# Patient Record
Sex: MALE | URBAN - NONMETROPOLITAN AREA
[De-identification: names, ages, dates, MRNs, and addresses within clinical notes are randomized per-mention and may not be internally consistent; named-entity substitution may affect disease eponyms.]

---

## 2018-02-17 ENCOUNTER — NURSE TRIAGE (OUTPATIENT)
Dept: ADMINISTRATIVE | Age: 68
End: 2018-02-17

## 2018-02-17 NOTE — TELEPHONE ENCOUNTER
Pain with coughing           Reason for Disposition   [1] Continuous (nonstop) coughing interferes with work or school AND [2] no improvement using cough treatment per Care Advice    Answer Assessment - Initial Assessment Questions  1. ONSET: \"When did the cough begin? \"       For about 4-5 day  2. SEVERITY: \"How bad is the cough today? \"       Worse than today  3. RESPIRATORY DISTRESS: \"Describe your breathing. \"       Can still stleep  4. FEVER: \"Do you have a fever? \" If so, ask: \"What is your temperature, how was it measured, and when did it start? \"      No fever  5. SPUTUM: \"Describe the color of your sputum\" (clear, white, yellow, green)      White sputum  6. HEMOPTYSIS: \"Are you coughing up any blood? \" If so ask: \"How much? \" (flecks, streaks, tablespoons, etc.)      no  7. CARDIAC HISTORY: \"Do you have any history of heart disease? \" (e.g., heart attack, congestive heart failure)       no  8. LUNG HISTORY: \"Do you have any history of lung disease? \"  (e.g., pulmonary embolus, asthma, emphysema)      no  9. PE RISK FACTORS: \"Do you have a history of blood clots? \" (or: recent major surgery, recent prolonged travel, bedridden )      No   10. OTHER SYMPTOMS: \"Do you have any other symptoms? \" (e.g., runny nose, wheezing, chest pain)      coughing  11. PREGNANCY: \"Is there any chance you are pregnant? \" \"When was your last menstrual period? \"     male  15. TRAVEL: \"Have you traveled out of the country in the last month? \" (e.g., travel history, exposures)      no    Protocols used: COUGH - ACUTE PRODUCTIVE-ADULT-AH

## 2019-04-16 ENCOUNTER — NURSE TRIAGE (OUTPATIENT)
Dept: ADMINISTRATIVE | Age: 69
End: 2019-04-16

## 2022-11-09 RX ORDER — AMLODIPINE BESYLATE 5 MG/1
5 TABLET ORAL DAILY
COMMUNITY

## 2022-11-09 RX ORDER — HYDROCHLOROTHIAZIDE 12.5 MG/1
12.5 CAPSULE, GELATIN COATED ORAL DAILY
COMMUNITY

## 2022-11-09 RX ORDER — LEVOTHYROXINE SODIUM 0.03 MG/1
25 TABLET ORAL DAILY
COMMUNITY

## 2022-11-09 RX ORDER — METOPROLOL SUCCINATE 25 MG/1
25 TABLET, EXTENDED RELEASE ORAL DAILY
COMMUNITY

## 2022-11-09 RX ORDER — ATORVASTATIN CALCIUM 20 MG/1
20 TABLET, FILM COATED ORAL DAILY
COMMUNITY

## 2022-11-09 RX ORDER — LOSARTAN POTASSIUM 100 MG/1
100 TABLET ORAL DAILY
COMMUNITY

## 2022-11-09 NOTE — PROGRESS NOTES
Patient instructed on the pre-operative, intra-operative, and post-operative process. Patient instructed on NPO status. Medication instructions and pre operative instruction sheet reviewed with the patient. CHG skin prep instructions reviewed with patient. Pt states he was not given surgical soap. Instructed pt to shower the night before and morning of surgery with anti bacterial soap. Instructed pt to take lipitor, metoprolol, amlodipine, and synthroid with a small sip of water prior to arriving to the hospital the day of surgery and to bring crutches to hospital day of surgery.

## 2022-11-10 ENCOUNTER — ANESTHESIA EVENT (OUTPATIENT)
Dept: OPERATING ROOM | Age: 72
End: 2022-11-10
Payer: MEDICARE

## 2022-11-11 ENCOUNTER — ANESTHESIA (OUTPATIENT)
Dept: OPERATING ROOM | Age: 72
End: 2022-11-11
Payer: MEDICARE

## 2022-11-11 ENCOUNTER — APPOINTMENT (OUTPATIENT)
Dept: GENERAL RADIOLOGY | Age: 72
End: 2022-11-11
Attending: PODIATRIST
Payer: MEDICARE

## 2022-11-11 ENCOUNTER — HOSPITAL ENCOUNTER (OUTPATIENT)
Age: 72
Setting detail: OUTPATIENT SURGERY
Discharge: HOME | End: 2022-11-11
Attending: PODIATRIST | Admitting: PODIATRIST
Payer: MEDICARE

## 2022-11-11 VITALS
OXYGEN SATURATION: 96 % | BODY MASS INDEX: 30.62 KG/M2 | RESPIRATION RATE: 20 BRPM | WEIGHT: 202 LBS | DIASTOLIC BLOOD PRESSURE: 75 MMHG | SYSTOLIC BLOOD PRESSURE: 131 MMHG | HEIGHT: 68 IN | TEMPERATURE: 97.5 F | HEART RATE: 61 BPM

## 2022-11-11 DIAGNOSIS — M19.072 ARTHRITIS OF LEFT FOOT: Primary | ICD-10-CM

## 2022-11-11 PROCEDURE — 6360000002 HC RX W HCPCS: Performed by: PODIATRIST

## 2022-11-11 PROCEDURE — C1713 ANCHOR/SCREW BN/BN,TIS/BN: HCPCS | Performed by: PODIATRIST

## 2022-11-11 PROCEDURE — 2580000003 HC RX 258: Performed by: NURSE ANESTHETIST, CERTIFIED REGISTERED

## 2022-11-11 PROCEDURE — 7100000001 HC PACU RECOVERY - ADDTL 15 MIN: Performed by: PODIATRIST

## 2022-11-11 PROCEDURE — 64445 NJX AA&/STRD SCIATIC NRV IMG: CPT | Performed by: NURSE ANESTHETIST, CERTIFIED REGISTERED

## 2022-11-11 PROCEDURE — 7100000010 HC PHASE II RECOVERY - FIRST 15 MIN: Performed by: PODIATRIST

## 2022-11-11 PROCEDURE — 2500000003 HC RX 250 WO HCPCS: Performed by: NURSE ANESTHETIST, CERTIFIED REGISTERED

## 2022-11-11 PROCEDURE — C1769 GUIDE WIRE: HCPCS | Performed by: PODIATRIST

## 2022-11-11 PROCEDURE — 6360000002 HC RX W HCPCS: Performed by: NURSE ANESTHETIST, CERTIFIED REGISTERED

## 2022-11-11 PROCEDURE — 7100000011 HC PHASE II RECOVERY - ADDTL 15 MIN: Performed by: PODIATRIST

## 2022-11-11 PROCEDURE — 64447 NJX AA&/STRD FEMORAL NRV IMG: CPT | Performed by: NURSE ANESTHETIST, CERTIFIED REGISTERED

## 2022-11-11 PROCEDURE — 3700000000 HC ANESTHESIA ATTENDED CARE: Performed by: PODIATRIST

## 2022-11-11 PROCEDURE — 3209999900 FLUORO FOR SURGICAL PROCEDURES

## 2022-11-11 PROCEDURE — 2709999900 HC NON-CHARGEABLE SUPPLY: Performed by: PODIATRIST

## 2022-11-11 PROCEDURE — 3700000001 HC ADD 15 MINUTES (ANESTHESIA): Performed by: PODIATRIST

## 2022-11-11 PROCEDURE — 3600000003 HC SURGERY LEVEL 3 BASE: Performed by: PODIATRIST

## 2022-11-11 PROCEDURE — 2720000010 HC SURG SUPPLY STERILE: Performed by: PODIATRIST

## 2022-11-11 PROCEDURE — 6370000000 HC RX 637 (ALT 250 FOR IP): Performed by: NURSE ANESTHETIST, CERTIFIED REGISTERED

## 2022-11-11 PROCEDURE — 3600000013 HC SURGERY LEVEL 3 ADDTL 15MIN: Performed by: PODIATRIST

## 2022-11-11 PROCEDURE — 7100000000 HC PACU RECOVERY - FIRST 15 MIN: Performed by: PODIATRIST

## 2022-11-11 DEVICE — SCREW BNE L80MM DIA7.3MM THRD L16MM CANC S STL SELF DRL ST: Type: IMPLANTABLE DEVICE | Site: FOOT | Status: FUNCTIONAL

## 2022-11-11 DEVICE — GRAFT HUM TISS 10ML FRMBL CELLULAR BNE MTRX CRYOPRESERVED: Type: IMPLANTABLE DEVICE | Site: FOOT | Status: FUNCTIONAL

## 2022-11-11 DEVICE — IMPLANTABLE DEVICE: Type: IMPLANTABLE DEVICE | Site: FOOT | Status: FUNCTIONAL

## 2022-11-11 DEVICE — SCREW BNE L85MM DIA7.3MM THRD L32MM CANC S STL SELF DRL ST: Type: IMPLANTABLE DEVICE | Site: FOOT | Status: FUNCTIONAL

## 2022-11-11 DEVICE — DBX PASTE, 1CC
Type: IMPLANTABLE DEVICE | Site: FOOT | Status: FUNCTIONAL
Brand: DBX®

## 2022-11-11 DEVICE — KIT STPL BRDG 18MM LEG 18MM MAX CLSR 10.4MM BME ELITE: Type: IMPLANTABLE DEVICE | Site: FOOT | Status: FUNCTIONAL

## 2022-11-11 DEVICE — KIT STPL W20XL20MM BNE FIX BRDG 2 LEG CONT COMPR BME ELITE: Type: IMPLANTABLE DEVICE | Site: FOOT | Status: FUNCTIONAL

## 2022-11-11 RX ORDER — DEXAMETHASONE SODIUM PHOSPHATE 4 MG/ML
INJECTION, SOLUTION INTRA-ARTICULAR; INTRALESIONAL; INTRAMUSCULAR; INTRAVENOUS; SOFT TISSUE PRN
Status: DISCONTINUED | OUTPATIENT
Start: 2022-11-11 | End: 2022-11-11 | Stop reason: SDUPTHER

## 2022-11-11 RX ORDER — FENTANYL CITRATE 50 UG/ML
25 INJECTION, SOLUTION INTRAMUSCULAR; INTRAVENOUS EVERY 5 MIN PRN
Status: DISCONTINUED | OUTPATIENT
Start: 2022-11-11 | End: 2022-11-11 | Stop reason: HOSPADM

## 2022-11-11 RX ORDER — SODIUM CHLORIDE, SODIUM LACTATE, POTASSIUM CHLORIDE, CALCIUM CHLORIDE 600; 310; 30; 20 MG/100ML; MG/100ML; MG/100ML; MG/100ML
INJECTION, SOLUTION INTRAVENOUS CONTINUOUS
Status: DISCONTINUED | OUTPATIENT
Start: 2022-11-11 | End: 2022-11-11 | Stop reason: HOSPADM

## 2022-11-11 RX ORDER — EPHEDRINE SULFATE/0.9% NACL/PF 25 MG/5 ML
SYRINGE (ML) INTRAVENOUS PRN
Status: DISCONTINUED | OUTPATIENT
Start: 2022-11-11 | End: 2022-11-11 | Stop reason: SDUPTHER

## 2022-11-11 RX ORDER — SODIUM CHLORIDE 0.9 % (FLUSH) 0.9 %
5-40 SYRINGE (ML) INJECTION EVERY 12 HOURS SCHEDULED
Status: DISCONTINUED | OUTPATIENT
Start: 2022-11-11 | End: 2022-11-11 | Stop reason: HOSPADM

## 2022-11-11 RX ORDER — FENTANYL CITRATE 50 UG/ML
INJECTION, SOLUTION INTRAMUSCULAR; INTRAVENOUS PRN
Status: DISCONTINUED | OUTPATIENT
Start: 2022-11-11 | End: 2022-11-11 | Stop reason: SDUPTHER

## 2022-11-11 RX ORDER — SODIUM CHLORIDE, SODIUM LACTATE, POTASSIUM CHLORIDE, CALCIUM CHLORIDE 600; 310; 30; 20 MG/100ML; MG/100ML; MG/100ML; MG/100ML
INJECTION, SOLUTION INTRAVENOUS CONTINUOUS PRN
Status: DISCONTINUED | OUTPATIENT
Start: 2022-11-11 | End: 2022-11-11 | Stop reason: SDUPTHER

## 2022-11-11 RX ORDER — SODIUM CHLORIDE 0.9 % (FLUSH) 0.9 %
5-40 SYRINGE (ML) INJECTION PRN
Status: DISCONTINUED | OUTPATIENT
Start: 2022-11-11 | End: 2022-11-11 | Stop reason: HOSPADM

## 2022-11-11 RX ORDER — SODIUM CHLORIDE 9 MG/ML
INJECTION, SOLUTION INTRAVENOUS PRN
Status: DISCONTINUED | OUTPATIENT
Start: 2022-11-11 | End: 2022-11-11 | Stop reason: HOSPADM

## 2022-11-11 RX ORDER — CEPHALEXIN 500 MG/1
500 CAPSULE ORAL 3 TIMES DAILY
Qty: 21 CAPSULE | Refills: 0 | Status: SHIPPED | OUTPATIENT
Start: 2022-11-11

## 2022-11-11 RX ORDER — LIDOCAINE HYDROCHLORIDE 20 MG/ML
INJECTION, SOLUTION EPIDURAL; INFILTRATION; INTRACAUDAL; PERINEURAL PRN
Status: DISCONTINUED | OUTPATIENT
Start: 2022-11-11 | End: 2022-11-11 | Stop reason: SDUPTHER

## 2022-11-11 RX ORDER — FENTANYL CITRATE 50 UG/ML
50 INJECTION, SOLUTION INTRAMUSCULAR; INTRAVENOUS EVERY 5 MIN PRN
Status: DISCONTINUED | OUTPATIENT
Start: 2022-11-11 | End: 2022-11-11 | Stop reason: HOSPADM

## 2022-11-11 RX ORDER — OXYCODONE HYDROCHLORIDE AND ACETAMINOPHEN 5; 325 MG/1; MG/1
1 TABLET ORAL EVERY 4 HOURS PRN
Qty: 40 TABLET | Refills: 0 | Status: SHIPPED | OUTPATIENT
Start: 2022-11-11 | End: 2022-11-18

## 2022-11-11 RX ORDER — PROPOFOL 10 MG/ML
INJECTION, EMULSION INTRAVENOUS PRN
Status: DISCONTINUED | OUTPATIENT
Start: 2022-11-11 | End: 2022-11-11 | Stop reason: SDUPTHER

## 2022-11-11 RX ORDER — ONDANSETRON 2 MG/ML
INJECTION INTRAMUSCULAR; INTRAVENOUS PRN
Status: DISCONTINUED | OUTPATIENT
Start: 2022-11-11 | End: 2022-11-11 | Stop reason: SDUPTHER

## 2022-11-11 RX ORDER — DEXAMETHASONE SODIUM PHOSPHATE 10 MG/ML
INJECTION, SOLUTION INTRAMUSCULAR; INTRAVENOUS PRN
Status: DISCONTINUED | OUTPATIENT
Start: 2022-11-11 | End: 2022-11-11 | Stop reason: SDUPTHER

## 2022-11-11 RX ORDER — ACETAMINOPHEN 325 MG/1
650 TABLET ORAL ONCE
Status: COMPLETED | OUTPATIENT
Start: 2022-11-11 | End: 2022-11-11

## 2022-11-11 RX ORDER — ROPIVACAINE HYDROCHLORIDE 5 MG/ML
INJECTION, SOLUTION EPIDURAL; INFILTRATION; PERINEURAL PRN
Status: DISCONTINUED | OUTPATIENT
Start: 2022-11-11 | End: 2022-11-11 | Stop reason: SDUPTHER

## 2022-11-11 RX ORDER — OXYCODONE HYDROCHLORIDE 5 MG/1
5 TABLET ORAL PRN
Status: DISCONTINUED | OUTPATIENT
Start: 2022-11-11 | End: 2022-11-11 | Stop reason: HOSPADM

## 2022-11-11 RX ORDER — OXYCODONE HYDROCHLORIDE 5 MG/1
10 TABLET ORAL PRN
Status: DISCONTINUED | OUTPATIENT
Start: 2022-11-11 | End: 2022-11-11 | Stop reason: HOSPADM

## 2022-11-11 RX ORDER — DIMENHYDRINATE 50 MG/1
50 TABLET ORAL ONCE
Status: COMPLETED | OUTPATIENT
Start: 2022-11-11 | End: 2022-11-11

## 2022-11-11 RX ADMIN — EPHEDRINE SULFATE 5 MG: 5 INJECTION INTRAVENOUS at 12:52

## 2022-11-11 RX ADMIN — FENTANYL CITRATE 25 MCG: 50 INJECTION INTRAMUSCULAR; INTRAVENOUS at 16:25

## 2022-11-11 RX ADMIN — PROPOFOL 200 MG: 10 INJECTION, EMULSION INTRAVENOUS at 12:36

## 2022-11-11 RX ADMIN — ROPIVACAINE HYDROCHLORIDE 20 ML: 5 INJECTION EPIDURAL; INFILTRATION; PERINEURAL at 11:52

## 2022-11-11 RX ADMIN — FENTANYL CITRATE 100 MCG: 50 INJECTION INTRAMUSCULAR; INTRAVENOUS at 11:28

## 2022-11-11 RX ADMIN — DIMENHYDRINATE 50 MG: 50 TABLET ORAL at 10:51

## 2022-11-11 RX ADMIN — DEXAMETHASONE SODIUM PHOSPHATE 10 MG: 10 INJECTION, SOLUTION INTRAMUSCULAR; INTRAVENOUS at 11:34

## 2022-11-11 RX ADMIN — LIDOCAINE HYDROCHLORIDE 5 ML: 20 INJECTION, SOLUTION EPIDURAL; INFILTRATION; INTRACAUDAL; PERINEURAL at 12:36

## 2022-11-11 RX ADMIN — ONDANSETRON 4 MG: 2 INJECTION INTRAMUSCULAR; INTRAVENOUS at 15:56

## 2022-11-11 RX ADMIN — EPHEDRINE SULFATE 5 MG: 5 INJECTION INTRAVENOUS at 13:03

## 2022-11-11 RX ADMIN — SODIUM CHLORIDE, POTASSIUM CHLORIDE, SODIUM LACTATE AND CALCIUM CHLORIDE: 600; 310; 30; 20 INJECTION, SOLUTION INTRAVENOUS at 11:07

## 2022-11-11 RX ADMIN — DEXAMETHASONE SODIUM PHOSPHATE 4 MG: 4 INJECTION, SOLUTION INTRAMUSCULAR; INTRAVENOUS at 12:50

## 2022-11-11 RX ADMIN — SODIUM CHLORIDE, POTASSIUM CHLORIDE, SODIUM LACTATE AND CALCIUM CHLORIDE: 600; 310; 30; 20 INJECTION, SOLUTION INTRAVENOUS at 14:03

## 2022-11-11 RX ADMIN — PROPOFOL 100 MG: 10 INJECTION, EMULSION INTRAVENOUS at 12:40

## 2022-11-11 RX ADMIN — ROPIVACAINE HYDROCHLORIDE 20 ML: 5 INJECTION EPIDURAL; INFILTRATION; PERINEURAL at 11:34

## 2022-11-11 RX ADMIN — FENTANYL CITRATE 25 MCG: 50 INJECTION INTRAMUSCULAR; INTRAVENOUS at 14:36

## 2022-11-11 RX ADMIN — FENTANYL CITRATE 25 MCG: 50 INJECTION INTRAMUSCULAR; INTRAVENOUS at 14:55

## 2022-11-11 RX ADMIN — SODIUM CHLORIDE, POTASSIUM CHLORIDE, SODIUM LACTATE AND CALCIUM CHLORIDE: 600; 310; 30; 20 INJECTION, SOLUTION INTRAVENOUS at 12:31

## 2022-11-11 RX ADMIN — EPHEDRINE SULFATE 5 MG: 5 INJECTION INTRAVENOUS at 13:18

## 2022-11-11 RX ADMIN — EPHEDRINE SULFATE 5 MG: 5 INJECTION INTRAVENOUS at 13:10

## 2022-11-11 RX ADMIN — ACETAMINOPHEN 650 MG: 325 TABLET ORAL at 10:51

## 2022-11-11 RX ADMIN — EPHEDRINE SULFATE 5 MG: 5 INJECTION INTRAVENOUS at 13:44

## 2022-11-11 RX ADMIN — FENTANYL CITRATE 25 MCG: 50 INJECTION INTRAMUSCULAR; INTRAVENOUS at 14:50

## 2022-11-11 RX ADMIN — CEFAZOLIN 2000 MG: 10 INJECTION, POWDER, FOR SOLUTION INTRAVENOUS at 12:27

## 2022-11-11 ASSESSMENT — LIFESTYLE VARIABLES: SMOKING_STATUS: 0

## 2022-11-11 NOTE — OP NOTE
Operative Note      Patient: Stephen Tran  YOB: 1950  MRN: [id number]    Date of Procedure: 11/11/2022    Pre-Op Diagnosis: EQUINUS CONTRACTURE OF LEFT ANKLE,  PRIMARY OSTEOARTHRITIS, POSTERIOR TIBIAL TENDONITIS    Post-Op Diagnosis: Same       Procedure(s):  GASTROCNEMIUS RECESSION  FOOT ARTHRODESIS-TRIPLE, COTTON PROCEDURE    Surgeon(s):  Vivian Villanueva DPM    Assistant:   First Assistant: Braxton Albrecht    Anesthesia: General    Estimated Blood Loss (mL): 143     Complications: None    Specimens:   none    Implants:  Implant Name Type Inv. Item Serial No.  Lot No. LRB No. Used Action   GRAFT HUM TISS 10ML FRMBL CELLULAR BNE MTRX CRYOPRESERVED - [id number]  GRAFT HUM TISS 10ML FRMBL CELLULAR BNE MTRX CRYOPRESERVED  [facility] [de-identified] Left 1 Implanted   KIT STPL [id number] BNE FIX BRDG 2 LEG CONT COMPR BME ELITE - [id number]  KIT STPL [id number] BNE FIX BRDG 2 LEG CONT COMPR BME ELITE  [facility]PUY [employer] [facility] [id number] Left 1 Implanted   KIT STPL [id number] BNE FIX BRDG 2 LEG CONT COMPR BME ELITE - [id number]  KIT STPL [id number] BNE FIX BRDG 2 LEG CONT COMPR BME ELITE  [facility] DEPUY [employer] [facility] [id number] Left 1 Implanted   KIT STPL [id number] BNE FIX BRDG 2 LEG CONT COMPR BME ELITE - [id number]  KIT STPL [id number] BNE FIX BRDG 2 LEG CONT COMPR BME ELITE  [facility] [employer] [facility] [id number] Left 1 Implanted   KIT STPL BRDG 18MM LEG 18MM MAX CLSR 10.4MM BME ELITE - [id number]  KIT STPL BRDG 18MM LEG 18MM MAX CLSR 10.4MM BME ELITE  [facility] [employer] [facility] [id number] Left 1 Implanted   SCREW BNE L64MM DIA4. 5MM HD DIA6.5MM CANC S STL ST SELF DRL - [id number]  SCREW BNE L64MM DIA4. 5MM HD DIA6.5MM CANC S STL ST SELF DRL  [facility] [employer] [place]  Left 1 Implanted   SCREW BNE L80MM DIA7. 3MM THRD L16MM CANC S STL SELF DRL ST - [id number]  SCREW BNE L80MM DIA7. 3MM THRD L16MM CANC S STL SELF DRL ST  [employer]PU[employer]  Left 1 Implanted   SCREW BNE L85MM DIA7. 3MM THRD L32MM CANC S STL SELF DRL ST - [id number]  SCREW BNE L85MM DIA7. 3MM THRD L32MM CANC S STL SELF DRL ST  DEPU[employer]  Left 1 Implanted   GRAFT BNE [id number] ARNOLD FOR TIPEEK FT OSTEOTMY WDG SYS - [id number]  GRAFT BNE [id number] CATALINA FOR TIPEEK FT OSTEOTMY WDG SYS  DEPUY [employer] [place] [id number] Left 1 Implanted   GRAFT BNE SUB 1ML DEMIN BNE MTRX PSTE SYR FRZ DRY DBX - [id number]  GRAFT BNE SUB 1ML DEMIN BNE MTRX PSTE SYR FRZ DRY DBX [id number] MUSCULOSKELETAL TRANSPL[employer] [facility]-  Left 1 Implanted         Drains: none    Findings: see the narrative     Detailed Description of Procedure: The patient has signs and symptoms, both clinically and radiographically, that are consistent with the preprocedure diagnosis. It was determined the patient would benefit from surgical intervention. All potential risks, benefits, and complications of surgery were discussed with the patient prior the procedure. The patient wished to proceed with surgery. Informed written consent was obtained. The patient was brought from the preoperative area placed in operating table in supine position. Following induction of anesthesia, the operative lower extremity was scrubbed, prepped, and draped in usual sterile fashion. The following procedure was then performed:  Procedure #1: Endoscopic gastrocnemius recession, left: Attention was directed toward the medial aspect patient's right leg where an incision was made slightly inferior to the medial head of gastrocnemius muscle at the level of the aponeurosis. Incision was deepened through subcutaneous slayer down to the level of the gastroc fascia. The fascia was incised allowing for exposure of the aponeurosis. The obturator/cannula was then introduced posterior to the aponeurosis. This was passed laterally through a small stab incision. The skin was tented laterally and a small stab incision made.  the 4.0 mm the scope was then introduced laterally and the aponeurosis directly visualized. At this time EGR hook blade was introduced and passed from lateral to medial, thereby transecting the fascia under direct visualization. Range of motion of the ankle in dorsiflexion was noted to improve from a preoperative value of 0° to a post procedure value of 12°. The instrumentation was removed. The incisions were copiously irrigated with sterile saline. The subcutaneous tissues were reapproximated utilizing 4-0 Vicryl. The skin was reapproximated utilizing 4-0 nylon suture running intradermal fashion. Attention was then directed to procedure #2. Procedure #2: Triple arthrodesis, left foot: Attention was directed to the lateral aspect of the hindfoot where a curvilinear incision was made at the tip of the fibula directed toward the fourth metatarsal base overlying the posterior subtalar joint and sinus tarsi. The incision was deepened through the subcutaneous layer down to the level of the deep tissues. The EDB muscle belly was reflected superiorly. The fatty plug within the sinus tarsi was then excised. The ligamentous and capsular attachments surrounding the subtalar joint were all released. The joint was distracted with a small joint distractor. The remaining cartilage and subchondral bone plate were resected with osteotomes and curettes. Subchondral drilling was carried out with a 2.5 mm drill. At this time graft material was packed within the arthrodesis site. A hindfoot valgus was corrected to near subtalar neutral position. The arthrodesis was then fixated with two 7.3 mm cannulated screws utilizing C arm fluoroscopy for proper alignment. Excellent compression across the arthrodesis site was achieved. Attention was then directed to the talonavicular joint. An incision was made overlying the dorsomedial aspect of the joint deepened through subcutaneous layer down to the capsule and periosteal tissues.   Capsular and periosteal incision was made. The joint was exposed. The joint was then released of all soft tissue ligamentous and capsular attachments. The joint was distracted with a small joint distractor. The cartilaginous surfaces were resected with a reciprocating rasp and osteotome. Subchondral drilling and fish scaling was carried out. The site was packed with cortical cancellous allograft material.  The foot was brought into appropriate neutral transverse plane alignment and plantar flexed at the level of the arthrodesis site. The arthrodesis site was fixated with a 4.5 mm cannulated screw as well as two 20 x 20 mm nitinol compression staples. Excellent compression was noted. Alignment was confirmed with intraoperative C-arm fluoroscopy. Attention was then directed to the calcaneocuboid joint. The joint was distracted with a small joint distractor and the cartilaginous surfaces resected utilizing a reciprocating rasp and osteotomes. Subchondral drilling and for scaling was carried out. The arthrodesis site was then fixated utilizing 220 x 20 mm nitinol compression staples. Excellent compression was noted. Alignment was confirmed with intraoperative C arm. The wound was copiously irrigated with sterile saline. The medial and lateral incisions were reapproximated with 2-0 and 3-0 Vicryl suture for the deep tissues and 3-0 nylon suture in a running interlocking fashion at the skin. Attention was then directed to procedure #3. Procedure #3: Cotton procedure, left foot: Attention was directed to the dorsal aspect of the foot where, utilizing C-arm fluoroscopy, the central aspect of the medial cuneiform was identified. A linear incision was made over this area. This was deepened through subcutaneous layer with care taken to identify and retract all vital neurovascular structures. Dissection was carried down to level the periosteum. A periosteal incision was made allowing for exposure of the bone.   At this time a sagittal saw was used to osteotomize the medial cuneiform from dorsal to plantar, with care taken to preserve a plantar cortical hinge. At this time the trial sizers were used to determine the appropriate size implant. This was noted to correspond to a 8 x 18 mm wedge implant. The implant was then placed and tamped firmly into place. Graft material was then placed around the thyroid adjacent to the implant. Alignment was confirmed utilizing intraoperative C-arm fluoroscopy. The wounds copiously irrigated with sterile saline. The subcutaneous tissues were reapproximated with 4-0 Vicryl. The skin was reapproximated with 4-0 Monocryl suture in a running intradermal fashion. The wounds were dressed with Betadine soaked Adaptic, gauze, and Kerlix. Pre-cast padding, a posterior splint, and overlying double Ace wrap were applied. The patient tolerated the procedure and anesthesia well was transferred from the operating room to the PACU with vital signs stable and vascular status intact to the lower extremity. Following a period of postoperative monitoring, the patient will be discharged with appropriate wound care, ambulatory status, and follow-up instructions.   Sponge and needle count: Correct at the end of the procedure  Electronically signed by Arnaud Hood DPM on 11/11/2022 at 4:37 PM

## 2022-11-11 NOTE — DISCHARGE INSTRUCTIONS
SAME DAY SURGERY DISCHARGE INSTRUCTIONS    1. Do not drive or operate hazardous machinery for 24 hours. 2.  Do not make important personal or business decisions for 24 hours. 3.  Do not drink alcoholic beverages for 24 hours. 4.  Do not smoke tobacco products for 24 hours. 5.  Eat light foods (Jell-O, soups, etc....) and drink plenty of fluids (water, Sprite, etc...) up to 8 glasses per day, as you can tolerate. 6.  If your bandages become soaked with bright red blood, place another dressing pad over your bandages. (DO NOT remove original bandage.)  Call your surgeon for further instructions. A small amount of bright red blood is to be expected. 7.  Limit your activities for 24 hours. Do not engage in heavy work until your surgeon gives you permission. 8.  Report the following signs or any questions regarding your physical condition to your surgeon immediately:    Excessive swelling of, or around the wound area. Redness. Temperature of 100 degrees (F) or above. Excessive pain. 9.  Call your surgeon for any questions regarding your surgery. 10.  Call for an appointment to see your surgeon in one week. 11.  Wash hands before and after incision care. It is important to practice good personal hygiene during the post op period. Discharge Instructions for Peripheral Nerve Block     The following nerve block was performed for your surgery: ____Femoral_____. After the block, you will be unable to have control of the limb and this can last between 3-36 hours. 1. For leg surgery, get help to move around until the feeling in your leg returns. 2. Avoid excessive pressure on limb and having anything hot or cold come in direct contact with your block limb. Check temperature of things with your unblocked leg due to decreased sensation in your blocked side. 3. Many surgical procedures are painful immediately after surgery.  You will not be aware of this pain for several hours due to the block. As the block wears off, you may have tingling in your blocked leg. Start taking your pain medication, as prescribed, before you get the full feeling back in your leg. 4. Nerve blocks affect many types of nerves and can cause feelings such as:     * Numbness, tingling, heaviness, weakness or inability to move your leg, and a feeling that your leg has fallen asleep. 5. If you develop any problems with your blocked leg: * Swelling, redness, fever, or severe pain where the block was inserted that was not relieved with your pain medication please call your       surgeon. Dr. Mejia Pittman instructions:  1. Keep the surgical dressing and splint clean, dry, and intact  2. Elevate the operative extremity above heart level when seated or lying  3. Place ice behind the knee of the operative extremity 20 minutes per hour while awake  4.   Strict nonweightbearing on the operative extremity

## 2022-11-11 NOTE — BRIEF OP NOTE
Brief Postoperative Note      Patient: Abdi Haile  YOB: 1950  MRN: [id number]    Date of Procedure: 11/11/2022    Pre-Op Diagnosis: EQUINUS CONTRACTURE OF LEFT ANKLE,  PRIMARY OSTEOARTHRITIS, POSTERIOR TIBIAL TENDONITIS    Post-Op Diagnosis: Same       Procedure(s):  GASTROCNEMIUS RECESSION  FOOT ARTHRODESIS-TRIPLE, COTTON PROCEDURE    Surgeon(s):  Abraham Ayala DPM    Assistant:  First Assistant: Ryne Davis    Anesthesia: General    Estimated Blood Loss (mL): Minimal    Complications: None    Specimens:   none    Implants:  Implant Name Type Inv. Item Serial No.  Lot No. LRB No. Used Action   GRAFT HUM TISS 10ML FRMBL CELLULAR BNE MTRX CRYOPRESERVED - [id number]  GRAFT HUM TISS 10ML FRMBL CELLULAR BNE MTRX CRYOPRESERVED  [facility] [de-identified] Left 1 Implanted   KIT STPL [id number] BNE FIX BRDG 2 LEG CONT COMPR BME ELITE - [id number]  KIT STPL [id number] BNE FIX BRDG 2 LEG CONT COMPR BME ELITE  [facility]PUY [email or web address] [facility] [id number] Left 1 Implanted   KIT STPL [id number] BNE FIX BRDG 2 LEG CONT COMPR BME ELITE - [id number]  KIT STPL [id number] BNE FIX BRDG 2 LEG CONT COMPR BME ELITE  [facility] [email or web address] [facility] [id number] Left 1 Implanted   KIT STPL [id number] BNE FIX BRDG 2 LEG CONT COMPR BME ELITE - [id number]  KIT STPL [id number] BNE FIX BRDG 2 LEG CONT COMPR BME ELITE  [facility] DEPUY [employer]- [id number] Left 1 Implanted   KIT STPL BRDG 18MM LEG 18MM MAX CLSR 10.4MM BME ELITE - [id number]  KIT STPL BRDG 18MM LEG 18MM MAX CLSR 10.4MM BME ELITE  [facility] DEPUY [email or web address] [facility] [id number] Left 1 Implanted   SCREW BNE L64MM DIA4. 5MM HD DIA6.5MM CANC S STL ST SELF DRL - [id number]  SCREW BNE L64MM DIA4. 5MM HD DIA6.5MM CANC S STL ST SELF DRL  [employer]  Left 1 Implanted   SCREW BNE L80MM DIA7. 3MM THRD L16MM CANC S STL SELF DRL ST - [id number]  SCREW BNE L80MM DIA7. 3MM THRD L16MM CANC S STL SELF DRL ST  [employer]  Left 1 Implanted SCREW BNE L85MM DIA7. 3MM THRD L32MM CANC S STL SELF DRL ST - [id number]  SCREW BNE L85MM DIA7. 3MM THRD L32MM CANC S STL SELF DRL ST  [employer]  Left 1 Implanted   GRAFT BNE [id number] ARNOLD FOR TIPEEK FT OSTEOTMY [facility] SYS - [id number]  GRAFT BNE [id number] ARNOLD FOR TIPEEK FT OSTEOTMY [facility] SYS  DEPUY [employer] [id number] Left 1 Implanted   GRAFT BNE SUB 1ML DEMIN BNE MTRX PSTE SYR FRZ DRY DBX - [id number]  GRAFT BNE SUB 1ML DEMIN BNE MTRX PSTE SYR FRZ DRY DBX [id number] MUSCULOSKELETAL TRANSPLANT [facility]  Left 1 Implanted         Drains: none    Findings: see the narrative     Electronically signed by Jac Levy DPM on 11/11/2022 at 4:36 PM

## 2022-11-11 NOTE — PROGRESS NOTES
Patient verbalizes readiness for discharge. Discharge instructions given to patient and responsible adult, answered all questions, and verbalized understanding of discharge instructions. Discharge Criteria    Inpatients must meet Criteria 1 through 7. All other patients are either YES or N/A. If a NO is chosen then Anesthesia or Surgeon must be notified. 1.  Minimum 30 minutes after last dose of sedative medication, minimum 120 minutes after last dose of reversal agent. Yes      2. Systolic BP stable within 20 mmHg for 30 minutes & systolic BP between 90 & 276 or within 10 mmHg of baseline. Yes      3. Pulse between 60 and 100 or within 10 bpm of baseline. Yes      4. Spontaneous respiratory rate >/= 10 per minute. Yes      5. SaO2 >/= 95 or  >/= baseline. Yes      6. Able to cough and swallow or return to baseline function. Yes      7. Alert and oriented or return to baseline mental status. Yes      8. Demonstrates controlled, coordinated movements, ambulates with steady gait, or return to baseline activity function. Yes      9. Minimal or no pain or nausea, or at a level tolerable and acceptable to patient. Yes      10. Takes and retains oral fluids as allowed. Yes      11. Procedural / perioperative site stable. Minimal or no bleeding. Yes          12. If GI endoscopy procedure, minimal or no abdominal distention or passing flatus. N/A      13. Written discharge instructions and emergency telephone number provided. Yes      14. Accompanied by a responsible adult.     Yes

## 2022-11-11 NOTE — ANESTHESIA PROCEDURE NOTES
Peripheral Block    Patient location during procedure: pre-op  Reason for block: post-op pain management  Start time: 11/11/2022 11:48 AM  End time: 11/11/2022 11:52 AM  Staffing  Performed: resident/CRNA   Resident/CRNA: ERNST Cline CRNA  Preanesthetic Checklist  Completed: patient identified, IV checked, site marked, risks and benefits discussed, surgical/procedural consents, equipment checked, pre-op evaluation, timeout performed, anesthesia consent given, oxygen available and monitors applied/VS acknowledged  Peripheral Block   Patient position: supine  Prep: ChloraPrep  Provider prep: mask and sterile gloves  Patient monitoring: continuous pulse ox, frequent blood pressure checks and IV access  Block type: Femoral  Adductor canal  Laterality: left  Injection technique: single-shot  Guidance: ultrasound guided  Local infiltration: ropivacaine  Infiltration strength: 0.5 %  Local infiltration: ropivacaine  Dose: 20 mL    Needle   Needle type:  Other   Needle gauge: 22 G  Needle localization: anatomical landmarks and ultrasound guidanceOther needle type: Pajunk  Assessment   Injection assessment: negative aspiration for heme, no paresthesia on injection and no intravascular symptoms  Paresthesia pain: none  Slow fractionated injection: yes  Hemodynamics: stable  Real-time US image taken/store: yes  Outcomes: uncomplicated and patient tolerated procedure well

## 2022-11-11 NOTE — ANESTHESIA PROCEDURE NOTES
Peripheral Block    Patient location during procedure: pre-op  Reason for block: post-op pain management and at surgeon's request  Start time: 11/11/2022 11:28 AM  End time: 11/11/2022 11:34 AM  Staffing  Performed: resident/CRNA   Resident/CRNA: ERNST Tanner CRNA  Preanesthetic Checklist  Completed: patient identified, IV checked, site marked, risks and benefits discussed, surgical/procedural consents, equipment checked, pre-op evaluation, timeout performed, anesthesia consent given, oxygen available and monitors applied/VS acknowledged  Peripheral Block   Patient position: supine  Prep: ChloraPrep  Provider prep: mask and sterile gloves  Patient monitoring: continuous pulse ox, frequent blood pressure checks and IV access  Block type: Sciatic  Popliteal  Laterality: left  Injection technique: single-shot  Guidance: nerve stimulator and ultrasound guided  Local infiltration: ropivacaine and decadron  Infiltration strength: 20 %  Local infiltration: ropivacaine and decadron    Needle   Needle type:  Other   Needle gauge: 22 G  Needle localization: anatomical landmarks, ultrasound guidance and nerve stimulator (stim at 0.44 mA no stimulation at 0.2 mA)Other needle type: Pajunk  Assessment   Injection assessment: negative aspiration for heme, no paresthesia on injection, local visualized surrounding nerve on ultrasound, no intravascular symptoms and low pressure verified by pressure monitor  Paresthesia pain: none  Slow fractionated injection: yes  Hemodynamics: stable  Real-time US image taken/store: yes  Outcomes: uncomplicated and patient tolerated procedure well

## 2022-11-11 NOTE — ANESTHESIA POSTPROCEDURE EVALUATION
Department of Anesthesiology  Postprocedure Note    Patient: José Miguel Cespedes  MRN: [id number]  YOB: 1950  Date of evaluation: 11/11/2022      Procedure Summary     Date: 11/11/22 Room / Location: [address] [place] [address]    Anesthesia Start: 6485 Anesthesia Stop: 1630    Procedures:       GASTROCNEMIUS RECESSION (Left: Leg Lower)      FOOT ARTHRODESIS-TRIPLE, COTTON PROCEDURE (Left: Foot) Diagnosis:       Contracture of left ankle      (EQUINUS CONTRACTURE OF LEFT ANKLE,  PRIMARY OSTEOARTHRITIS, POSTERIOR TIBIAL TENDONITIS)    Surgeons: Antonio Mcgovern DPM Responsible Provider: Encarnacion Litten, APRN - CRNA    Anesthesia Type: general, regional ASA Status: 3          Anesthesia Type: No value filed.     Minesh Phase I: Minesh Score: 10    Minesh Phase II: Minesh Score: 10      Anesthesia Post Evaluation    Patient location during evaluation: bedside  Patient participation: complete - patient participated  Level of consciousness: awake and alert  Airway patency: patent  Nausea & Vomiting: no nausea and no vomiting  Complications: no  Cardiovascular status: hemodynamically stable  Respiratory status: acceptable  Hydration status: stable  Multimodal analgesia pain management approach

## (undated) DEVICE — GLOVE ORANGE PI 7   MSG9070

## (undated) DEVICE — DISCONTINUED NO SUB IDED TG GLOVE SURG SENSICARE ALOE LT LF PF ST GRN SZ 8

## (undated) DEVICE — GAUZE,SPONGE,FLUFF,6"X6.75",STRL,5/TRAY: Brand: MEDLINE

## (undated) DEVICE — SPONGE LAP W18XL18IN WHT COT 4 PLY FLD STRUNG RADPQ DISP ST

## (undated) DEVICE — FLEXIBLE YANKAUER,MEDIUM TIP, NO VACUUM CONTROL: Brand: ARGYLE

## (undated) DEVICE — KIT ARMOR C DRP COLLAPSIBLE AND SELF EXP TOP CVR FOR FLUOROSCOPIC

## (undated) DEVICE — BANDAGE COMPR W4INXL12FT E DISP ESMARCH EVEN

## (undated) DEVICE — UNDERGLOVE SURG SZ 7 BLU LTX FREE SYN POLYISOPRENE POLYMER

## (undated) DEVICE — BLADE SURG NO15 S STL STR DISP GLASSVAN

## (undated) DEVICE — HYPODERMIC SAFETY NEEDLE: Brand: MAGELLAN

## (undated) DEVICE — LARGE TEAR CROSS CUT RASP (14.0 X 7.0MM)

## (undated) DEVICE — KIT DRL TMPLT 15MM 18MM 20MM 25MM 30MM CORRESPONDING

## (undated) DEVICE — GUIDEWIRE ORTH L300MM DIA2.8MM S STL THRD SHRP TRCR TIP FOR

## (undated) DEVICE — CUFF REPROCESS BP TOURN SING PORT SING BLDR 4X34IN

## (undated) DEVICE — SUTURE VCRL SZ 4-0 L27IN ABSRB UD L26MM SH 1/2 CIR J415H

## (undated) DEVICE — STOCKINETTE ORTH W9XL36IN COT 2 PLY HLLW FOR HANDLING LMB

## (undated) DEVICE — ASTOUND STANDARD SURGICAL GOWN, XL: Brand: CONVERTORS

## (undated) DEVICE — COTTON UNDERCAST PADDING,REGULAR FINISH: Brand: WEBRIL

## (undated) DEVICE — NEEDLE HYPO 25GA L1.5IN BLU POLYPR HUB S STL REG BVL STR

## (undated) DEVICE — PAD,ABDOMINAL,8"X10",ST,LF: Brand: MEDLINE

## (undated) DEVICE — THIN OFFSET (9.0 X 0.38 X 25.0MM)

## (undated) DEVICE — TOWEL,OR,DSP,ST,NATURAL,DLX,4/PK,20PK/CS: Brand: MEDLINE

## (undated) DEVICE — DRILL BIT 3.2MM

## (undated) DEVICE — YANKAUER,FLEXIBLE HANDLE,REGLR CAPACITY: Brand: MEDLINE INDUSTRIES, INC.

## (undated) DEVICE — SUTURE ETHLN SZ 3-0 L18IN NONABSORBABLE BLK L24MM PS-1 3/8 1663G

## (undated) DEVICE — SHEET,DRAPE,53X77,STERILE: Brand: MEDLINE

## (undated) DEVICE — TUBING, SUCTION, 9/32" X 12', STRAIGHT: Brand: MEDLINE INDUSTRIES, INC.

## (undated) DEVICE — ZIMMER® STERILE DISPOSABLE TOURNIQUET CUFF WITH PROTECTIVE SLEEVE AND PLC, SINGLE PORT, SINGLE BLADDER, 34 IN. (86 CM)

## (undated) DEVICE — SUTURE MCRYL SZ 4-0 L27IN ABSRB UD L19MM PS-2 1/2 CIR PRIM Y426H

## (undated) DEVICE — BANDAGE,GAUZE,BULKEE II,4.5"X4.1YD,STRL: Brand: MEDLINE

## (undated) DEVICE — DRESSING PETRO W3XL8IN OIL EMUL N ADH GZ KNIT IMPREG CELOS

## (undated) DEVICE — DRILL BIT

## (undated) DEVICE — BANDAGE COMPR W4INXL9FT EXSANG SGL LAYERED NO CLSR ESMARCH

## (undated) DEVICE — YANKAUER,BULB TIP,W/O VENT,RIGID,STERILE: Brand: MEDLINE

## (undated) DEVICE — IMPLANTABLE DEVICE
Type: IMPLANTABLE DEVICE | Site: FOOT | Status: NON-FUNCTIONAL
Removed: 2022-11-11

## (undated) DEVICE — GAUZE,SPONGE,FLUFF,4"X4",12PLY,STRL,2'S: Brand: MEDLINE

## (undated) DEVICE — SUTURE VCRL SZ 2-0 L27IN ABSRB UD L26MM SH 1/2 CIR J417H

## (undated) DEVICE — EPF KIT: Brand: ENDOTRAC

## (undated) DEVICE — STRIP,CLOSURE,WOUND,MEDI-STRIP,1/2X4: Brand: MEDLINE

## (undated) DEVICE — SUTURE NONABSORBABLE MONOFILAMENT 3-0 PS-1 18 IN BLK ETHILON 1663H

## (undated) DEVICE — SUTURE VCRL + SZ 3-0 L27IN ABSRB UD L26MM SH 1/2 CIR VCP416H

## (undated) DEVICE — KIT DRL BIT DIA3MM LOC PIN TAMP K WIRE CORRESPONDING

## (undated) DEVICE — PADDING,UNDERCAST,COTTON, 4"X4YD STERILE: Brand: MEDLINE

## (undated) DEVICE — UNDERGLOVE SURG SZ 8 BLU LTX FREE SYN POLYISOPRENE POLYMER

## (undated) DEVICE — HAND AND FT PK

## (undated) DEVICE — BANDAGE COBAN 4 IN COMPR W4INXL5YD FOAM COHESIVE QUIK STK SELF ADH SFT